# Patient Record
Sex: MALE | Race: WHITE | NOT HISPANIC OR LATINO | Employment: FULL TIME | URBAN - METROPOLITAN AREA
[De-identification: names, ages, dates, MRNs, and addresses within clinical notes are randomized per-mention and may not be internally consistent; named-entity substitution may affect disease eponyms.]

---
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
## 2020-12-22 ENCOUNTER — THERAPY VISIT (OUTPATIENT)
Dept: PHYSICAL THERAPY | Facility: CLINIC | Age: 20
End: 2020-12-22
Payer: COMMERCIAL

## 2020-12-22 DIAGNOSIS — M89.8X1 PERISCAPULAR PAIN: ICD-10-CM

## 2020-12-22 DIAGNOSIS — G25.89 SCAPULAR DYSKINESIS: ICD-10-CM

## 2020-12-22 PROCEDURE — 97112 NEUROMUSCULAR REEDUCATION: CPT | Mod: GP | Performed by: PHYSICAL THERAPIST

## 2020-12-22 PROCEDURE — 97161 PT EVAL LOW COMPLEX 20 MIN: CPT | Mod: GP | Performed by: PHYSICAL THERAPIST

## 2020-12-22 PROCEDURE — 97110 THERAPEUTIC EXERCISES: CPT | Mod: GP | Performed by: PHYSICAL THERAPIST

## 2020-12-22 NOTE — LETTER
[facility]  [address]  [address]  [facility] [address]  [phone]    2020    Re: Armond Hidalgo   :   2000  MRN:  [id number]   REFERRING PHYSICIAN:   Taniya PANTOJA ARI PT    Date of Initial Evaluation:  20  Visits:  Rxs Used: 1  Reason for Referral:     Periscapular pain  Scapular dyskinesis    EVALUATION SUMMARY    [facility] Initial Evaluation  Subjective:    Therapist Generated HPI Evaluation  Problem details: Asad is being seen today for left periscapular pain, scapular dyskinesis, and left shoulder capsular tightness by referral from Dr. Taniya Landers..         Type of problem:  Thoracic.    This is a new condition.  Condition occurred with:  Insidious onset.  Where condition occurred: for unknown reasons.  Site of Pain: medial to the inferior angle of the left scapula.  Pain is described as aching and sharp   Pain radiates to:  No radiation. Pain is the same all the time.  Since onset symptoms are unchanged.  Associated with: none. Exacerbated by: deep breaths, especially when he is in a forward head and rounded shoulders posture.  Relieved by: improved with good posture / alignment.  Imaging testing: none reported.  Previous treatment includes chiropractic. There was mild improvement following previous treatment.            Objective:  Standing Alignment:    Cervical/Thoracic:  Forward head, flat thoracic upper spine and thoracic kyphosis decreased (presents with increased muscle tone and hypertrophy on right right as conmpared to left)  Shoulder/UE:  Rounded shoulders, scapular winging L and scapular winging R    Gait:    Gait Type:  Normal   Assistive Devices:  None                    Re: Armond Hidalgo   :   2000      Cervical/Thoracic Evaluation  Cervical AROM: normal   Thoracic AROM: normal    Strength: periscapular strength:  Left MT = 4-/5, Left LT = 3/5  Headaches: none  Cervical Myotomes:   normal      Shoulder Evaluation:  ROM:  AROM:  normal    Strength:    Flexion: Left:5-/5   Pain:    Right: 5/5     Pain:     Abduction:  Left: 5-/5  Pain:    Right: 5/5     Pain:    Internal Rotation:  Left:5-/5     Pain:    Right: 5/5     Pain:  External Rotation:   Left:4+/5     Pain:   Right:5/5     Pain:    Horizontal Abduction:  Left:3+/5     Pain:    Right:4+/5    Pain:    Stability Testing:  not assessed    Special Tests:  not assessed    Assessment/Plan:    Patient is a 19 year old male with thoracic and left side shoulder complaints.    Patient has the following significant findings with corresponding treatment plan.                Diagnosis 1:  Left Periscapular Pain  Pain -  hot/cold therapy, self management, education and home program  Decreased strength - therapeutic exercise, therapeutic activities and home program  Impaired muscle performance - neuro re-education and home program  Decreased function - therapeutic activities and home program  Impaired posture - neuro re-education    Therapy Evaluation Codes:   1) History comprised of:   Personal factors that impact the plan of care:      None.    Comorbidity factors that impact the plan of care are:      None.     Medications impacting care: None.  2) Examination of Body Systems comprised of:   Body structures and functions that impact the plan of care:      Shoulder and Thoracic Spine.   Activity limitations that impact the plan of care are:      Driving, Reading/Computer work, Working and Breathing.  3) Clinical presentation characteristics are:   Stable/Uncomplicated.  4) Decision-Making    Low complexity using standardized patient assessment instrument and/or measureable assessment of functional outcome.  Cumulative Therapy Evaluation is: Low complexity.    Previous and current functional limitations:  (See Goal Flow Sheet for this information)    Short term and Long term goals: (See Goal Flow Sheet for this information)     Re: Armond Hidalgo    :   2000    Communication ability:  Patient appears to be able to clearly communicate and understand verbal and written communication and follow directions correctly.  Treatment Explanation - The following has been discussed with the patient:   RX ordered/plan of care  Anticipated outcomes  Possible risks and side effects  This patient would benefit from PT intervention to resume normal activities.   Rehab potential is excellent.    Frequency:  1 X week, once daily  Duration:  for 6 weeks  Discharge Plan:  Achieve all LTG.  Independent in home treatment program.  Reach maximal therapeutic benefit.    Thank you for your referral.    INQUIRIES  Therapist: Kaden Rodriguez, ZOFIA, SCS, CCI   [facility]  [address]  [address] [place] [id number]  Phone: [phone]  Fax: [phone]

## 2020-12-22 NOTE — PROGRESS NOTES
[facility] Initial Evaluation  Subjective:    Therapist Generated HPI Evaluation  Problem details: Asad is being seen today for left periscapular pain, scapular dyskinesis, and left shoulder capsular tightness by referral from Dr. Taniya Landers..         Type of problem:  Thoracic.    This is a new condition.  Condition occurred with:  Insidious onset.  Where condition occurred: for unknown reasons.  Site of Pain: medial to the inferior angle of the left scapula.  Pain is described as aching and sharp   Pain radiates to:  No radiation. Pain is the same all the time.  Since onset symptoms are unchanged.  Associated with: none. Exacerbated by: deep breaths, especially when he is in a forward head and rounded shoulders posture.  Relieved by: improved with good posture / alignment.  Imaging testing: none reported.  Previous treatment includes chiropractic. There was mild improvement following previous treatment.                          Objective:  Standing Alignment:    Cervical/Thoracic:  Forward head, flat thoracic upper spine and thoracic kyphosis decreased (presents with increased muscle tone and hypertrophy on right right as conmpared to left)  Shoulder/UE:  Rounded shoulders, scapular winging L and scapular winging R              Gait:    Gait Type:  Normal   Assistive Devices:  None                      Cervical/Thoracic Evaluation  Cervical AROM: normal   Thoracic AROM: normal    Strength: periscapular strength:  Left MT = 4-/5, Left LT = 3/5  Headaches: none  Cervical Myotomes:  normal                                       Shoulder Evaluation:  ROM:  AROM:  normal                                  Strength:    Flexion: Left:5-/5   Pain:    Right: 5/5     Pain:     Abduction:  Left: 5-/5  Pain:    Right: 5/5     Pain:    Internal Rotation:  Left:5-/5     Pain:    Right: 5/5     Pain:  External Rotation:   Left:4+/5     Pain:   Right:5/5     Pain:    Horizontal Abduction:  Left:3+/5      Pain:    Right:4+/5    Pain:        Stability Testing:  not assessed      Special Tests:  not assessed                                           General     ROS    Assessment/Plan:    Patient is a 19 year old male with thoracic and left side shoulder complaints.    Patient has the following significant findings with corresponding treatment plan.                Diagnosis 1:  Left Periscapular Pain  Pain -  hot/cold therapy, self management, education and home program  Decreased strength - therapeutic exercise, therapeutic activities and home program  Impaired muscle performance - neuro re-education and home program  Decreased function - therapeutic activities and home program  Impaired posture - neuro re-education    Therapy Evaluation Codes:   1) History comprised of:   Personal factors that impact the plan of care:      None.    Comorbidity factors that impact the plan of care are:      None.     Medications impacting care: None.  2) Examination of Body Systems comprised of:   Body structures and functions that impact the plan of care:      Shoulder and Thoracic Spine.   Activity limitations that impact the plan of care are:      Driving, Reading/Computer work, Working and Breathing.  3) Clinical presentation characteristics are:   Stable/Uncomplicated.  4) Decision-Making    Low complexity using standardized patient assessment instrument and/or measureable assessment of functional outcome.  Cumulative Therapy Evaluation is: Low complexity.    Previous and current functional limitations:  (See Goal Flow Sheet for this information)    Short term and Long term goals: (See Goal Flow Sheet for this information)     Communication ability:  Patient appears to be able to clearly communicate and understand verbal and written communication and follow directions correctly.  Treatment Explanation - The following has been discussed with the patient:   RX ordered/plan of care  Anticipated outcomes  Possible risks and side  effects  This patient would benefit from PT intervention to resume normal activities.   Rehab potential is excellent.    Frequency:  1 X week, once daily  Duration:  for 6 weeks  Discharge Plan:  Achieve all LTG.  Independent in home treatment program.  Reach maximal therapeutic benefit.    Please refer to the daily flowsheet for treatment today, total treatment time and time spent performing 1:1 timed codes.

## 2020-12-29 ENCOUNTER — THERAPY VISIT (OUTPATIENT)
Dept: PHYSICAL THERAPY | Facility: CLINIC | Age: 20
End: 2020-12-29
Payer: COMMERCIAL

## 2020-12-29 DIAGNOSIS — G25.89 SCAPULAR DYSKINESIS: ICD-10-CM

## 2020-12-29 DIAGNOSIS — M89.8X1 PERISCAPULAR PAIN: ICD-10-CM

## 2020-12-29 PROCEDURE — 97112 NEUROMUSCULAR REEDUCATION: CPT | Mod: GP

## 2020-12-29 PROCEDURE — 97110 THERAPEUTIC EXERCISES: CPT | Mod: GP

## 2021-01-06 ENCOUNTER — THERAPY VISIT (OUTPATIENT)
Dept: PHYSICAL THERAPY | Facility: CLINIC | Age: 21
End: 2021-01-06
Payer: COMMERCIAL

## 2021-01-06 DIAGNOSIS — M89.8X1 PERISCAPULAR PAIN: ICD-10-CM

## 2021-01-06 DIAGNOSIS — G25.89 SCAPULAR DYSKINESIS: ICD-10-CM

## 2021-01-06 PROCEDURE — 97110 THERAPEUTIC EXERCISES: CPT | Mod: GP

## 2021-01-06 PROCEDURE — 97112 NEUROMUSCULAR REEDUCATION: CPT | Mod: GP

## 2021-01-13 ENCOUNTER — THERAPY VISIT (OUTPATIENT)
Dept: PHYSICAL THERAPY | Facility: CLINIC | Age: 21
End: 2021-01-13
Payer: COMMERCIAL

## 2021-01-13 DIAGNOSIS — M89.8X1 PERISCAPULAR PAIN: ICD-10-CM

## 2021-01-13 DIAGNOSIS — G25.89 SCAPULAR DYSKINESIS: ICD-10-CM

## 2021-01-13 PROCEDURE — 97112 NEUROMUSCULAR REEDUCATION: CPT | Mod: GP

## 2021-01-13 PROCEDURE — 97110 THERAPEUTIC EXERCISES: CPT | Mod: GP

## 2021-03-04 NOTE — PROGRESS NOTES
DISCHARGE SUMMARY    Armond Hidalgo was seen 4 times for evaluation and treatment.  Patient did not return for further treatment and current status is unknown.  Due to short treatment duration, no objective or functional changes were made.  Please see goal flow sheet from episode noted date below and initial evaluation for further information.  Patient is discharged from therapy and therapy episode is resolved as of 03/04/21.      Linked Episodes   Type: Episode: Status: Noted: Resolved: Last update: Updated by:   PHYSICAL THERAPY Left periscapular pain Active 12/22/2020 1/13/2021  2:20 PM Kaden Rodriguez, PT      Comments:

## 2023-05-17 ENCOUNTER — OFFICE VISIT (OUTPATIENT)
Dept: FAMILY MEDICINE | Facility: CLINIC | Age: 23
End: 2023-05-17
Payer: COMMERCIAL

## 2023-05-17 VITALS
HEIGHT: 75 IN | BODY MASS INDEX: 22.34 KG/M2 | WEIGHT: 179.7 LBS | DIASTOLIC BLOOD PRESSURE: 60 MMHG | SYSTOLIC BLOOD PRESSURE: 130 MMHG | TEMPERATURE: 96.5 F | HEART RATE: 109 BPM | OXYGEN SATURATION: 98 % | RESPIRATION RATE: 18 BRPM

## 2023-05-17 DIAGNOSIS — J02.9 SORE THROAT: ICD-10-CM

## 2023-05-17 DIAGNOSIS — B00.2 HERPETIC GINGIVOSTOMATITIS: Primary | ICD-10-CM

## 2023-05-17 DIAGNOSIS — M54.50 ACUTE MIDLINE LOW BACK PAIN WITHOUT SCIATICA: ICD-10-CM

## 2023-05-17 DIAGNOSIS — M40.00 ACQUIRED POSTURAL KYPHOSIS: ICD-10-CM

## 2023-05-17 DIAGNOSIS — Q21.12 PFO (PATENT FORAMEN OVALE): ICD-10-CM

## 2023-05-17 DIAGNOSIS — Q23.81 BICUSPID AORTIC VALVE: ICD-10-CM

## 2023-05-17 PROBLEM — G25.89 SCAPULAR DYSKINESIS: Status: RESOLVED | Noted: 2020-12-22 | Resolved: 2023-05-17

## 2023-05-17 PROBLEM — M89.8X1 PERISCAPULAR PAIN: Status: RESOLVED | Noted: 2020-12-22 | Resolved: 2023-05-17

## 2023-05-17 LAB — DEPRECATED S PYO AG THROAT QL EIA: NEGATIVE

## 2023-05-17 PROCEDURE — 87651 STREP A DNA AMP PROBE: CPT | Performed by: PHYSICIAN ASSISTANT

## 2023-05-17 PROCEDURE — 99203 OFFICE O/P NEW LOW 30 MIN: CPT | Performed by: PHYSICIAN ASSISTANT

## 2023-05-17 RX ORDER — MOMETASONE FUROATE MONOHYDRATE 50 UG/1
SPRAY, METERED NASAL
COMMUNITY

## 2023-05-17 RX ORDER — OMEGA-3 FATTY ACIDS/FISH OIL 300-1000MG
200 CAPSULE ORAL 3 TIMES DAILY PRN
Start: 2023-05-17 | End: 2023-05-18

## 2023-05-17 RX ORDER — ACETAMINOPHEN 500 MG
1000 TABLET ORAL 3 TIMES DAILY PRN
Start: 2023-05-17

## 2023-05-17 RX ORDER — RIZATRIPTAN BENZOATE 10 MG/1
TABLET ORAL
COMMUNITY
Start: 2021-06-25

## 2023-05-17 RX ORDER — HYDROCORTISONE 25 MG/G
OINTMENT TOPICAL
COMMUNITY
Start: 2023-05-13 | End: 2023-11-06

## 2023-05-17 NOTE — PROGRESS NOTES
Assessment & Plan     Herpetic gingivostomatitis  Sore throat  Strep negative.  Symptoms consistent with viral process.  Offered magic mouthwash - but pt declined.  Will let me know if he changes his mind.  For now - supportive cares encouraged.  Crushed ice/water drink for comfort.  Advised of warning signs and when to seek urgent care.  Alternate tylenol/ibuprofen for pain management.    - acetaminophen (TYLENOL) 500 MG tablet  - ibuprofen (ADVIL/MOTRIN) 200 MG capsule  - Streptococcus A Rapid Screen w/Reflex to PCR - Clinic Collect  - Group A Streptococcus PCR Throat Swab    Bicuspid aortic valve  PFO (patent foramen ovale)  Historical.  Follows with cardiology.    Acute midline low back pain without sciatica  Acquired postural kyphosis  Suspect current 1 day flare of back pain due to viral illness.  Pt has postural kyphosis and works in a very sedentary role.  Would benefit from PHYSICAL THERAPY.  Referral placed.  Supportive analgesics recommended.    - acetaminophen (TYLENOL) 500 MG tablet  - ibuprofen (ADVIL/MOTRIN) 200 MG capsule  - Physical Therapy Referral      [facility]) [facility]  reviewed       Return in about 1 week (around 5/24/2023) for PT.    Mayra Gonzalez PA-C  [facility] MICHAEL Leahy is a 22 year old, presenting for the following health issues:  Pharyngitis        5/17/2023     3:00 PM   Additional Questions   Roomed by Elizabeth Centeno CMA   Accompanied by Mom     History of Present Illness       Back Pain:  He presents for follow up of back pain. Patient's back pain is a new problem.    Original cause of back pain: not sure  First noticed back pain: today  Patient feels back pain: constantlyLocation of back pain:  Right lower back, left lower back, right side of neck and left side of neck  Description of back pain: sharp  Back pain spreads: nowhere    Since patient first noticed  "back pain, pain is: unchanged  Does back pain interfere with his job:  Not applicable  On a scale of 1-10 (10 being the worst), patient describes pain as:  8  What makes back pain worse: nothing  Acupuncture: not tried  Acetaminophen: not tried  Activity or exercise: not tried  Chiropractor:  Not tried  Cold: helpful  Heat: not tried  Massage: not tried  Muscle relaxants: not tried  NSAIDS: not tried  Opioids: not tried  Physical Therapy: not tried  Rest: helpful  Steroid Injection: not tried  Stretching: not tried  Surgery: not tried  TENS unit: not tried  Topical pain relievers: not tried  Other healthcare providers patient is seeing for back pain: Chiropractor    Headaches:   Since the patient's last clinic visit, headaches are: improved  The patient is getting headaches:  Rarely  He is not able to do normal daily activities when he has a migraine.  The patient is taking the following rescue/relief medications:  Maxalt   Patient states \"I get some relief\" from the rescue/relief medications.   The patient is taking the following medications to prevent migraines:  No medications to prevent migraines  In the past 4 weeks, the patient has gone to an Urgent Care or Emergency Room 0 times times due to headaches.    Reason for visit:  Feeling ill, headaches,back pain,neck pain,nausea,sore throat  Symptom onset:  3-7 days ago  Symptoms include:  Body aches, sore throat, nausea  Symptom intensity:  Moderate  Symptom progression:  Staying the same  Had these symptoms before:  Yes  Has tried/received treatment for these symptoms:  Yes  Previous treatment was successful:  Yes  Prior treatment description:  Medication  What makes it worse:  No  What makes it better:  Sleeping    He eats 2-3 servings of fruits and vegetables daily.He consumes 2 sweetened beverage(s) daily.He exercises with enough effort to increase his heart rate 60 or more minutes per day.  He exercises with enough effort to increase his heart rate 3 or less " "days per week.   He is taking medications regularly.             Review of Systems   Constitutional, HEENT, cardiovascular, pulmonary, gi and gu systems are negative, except as otherwise noted.      Objective    /60   Pulse 109   Temp (!) 96.5  F (35.8  C) (Tympanic)   Resp 18   Ht 1.905 m (6' 3\")   Wt 81.5 kg (179 lb 11.2 oz)   SpO2 98%   BMI 22.46 kg/m    Body mass index is 22.46 kg/m .  Physical Exam   GENERAL: healthy, alert and no distress  EYES: Eyes grossly normal to inspection, PERRL and conjunctivae and sclerae normal  HENT: ear canals and TM's normal, nose and ulcerative lesion noted at the very posterior soft palate near oropharynx opening.  Erythematous posterior pharynx with scattered small ulcerative lesions on pharynx and near tonsils  NECK: no adenopathy, no asymmetry, masses, or scars and thyroid normal to palpation  RESP: lungs clear to auscultation - no rales, rhonchi or wheezes  CV: regular rate and rhythm, normal S1 S2, no S3 or S4, no murmur, click or rub, no peripheral edema and peripheral pulses strong  MS: no gross musculoskeletal defects noted, no edema, exaggerated postural kyphosis and loss of lumbar lordosis noted with perilumbar and periscapular tenderness  SKIN: no suspicious lesions or rashes  NEURO: Normal strength and tone, mentation intact and speech normal  PSYCH: mentation appears normal, affect normal/bright    Results for orders placed or performed in visit on 05/17/23   Streptococcus A Rapid Screen w/Reflex to PCR - Clinic Collect     Status: Normal    Specimen: Throat; Swab   Result Value Ref Range    Group A Strep antigen Negative Negative                   "

## 2023-05-18 LAB — GROUP A STREP BY PCR: NOT DETECTED

## 2023-05-18 RX ORDER — OMEGA-3 FATTY ACIDS/FISH OIL 300-1000MG
800 CAPSULE ORAL 3 TIMES DAILY PRN
Start: 2023-05-18

## 2023-05-19 NOTE — RESULT ENCOUNTER NOTE
Armond  I have reviewed your recent test results:    -All of your labs are normal.    For additional lab test information, www.[email or web address] is an excellent reference.     If you have any questions please do not hesitate to contact our office via phone ([phone]) or MyChart.    Healthy regards,     Mayra Gonzalez MBA, MS, PA-C  [facility]

## 2023-06-02 ENCOUNTER — HEALTH MAINTENANCE LETTER (OUTPATIENT)
Age: 23
End: 2023-06-02

## 2023-11-06 ENCOUNTER — OFFICE VISIT (OUTPATIENT)
Dept: FAMILY MEDICINE | Facility: CLINIC | Age: 23
End: 2023-11-06

## 2023-11-06 VITALS
OXYGEN SATURATION: 99 % | TEMPERATURE: 98 F | HEART RATE: 62 BPM | DIASTOLIC BLOOD PRESSURE: 78 MMHG | SYSTOLIC BLOOD PRESSURE: 122 MMHG | BODY MASS INDEX: 23.61 KG/M2 | HEIGHT: 74 IN | RESPIRATION RATE: 20 BRPM | WEIGHT: 184 LBS

## 2023-11-06 DIAGNOSIS — Z13.1 DIABETES MELLITUS SCREENING: ICD-10-CM

## 2023-11-06 DIAGNOSIS — B35.6 TINEA CRURIS: ICD-10-CM

## 2023-11-06 DIAGNOSIS — Z13.220 LIPID SCREENING: ICD-10-CM

## 2023-11-06 DIAGNOSIS — Z76.89 HEALTH CARE HOME: ICD-10-CM

## 2023-11-06 DIAGNOSIS — Q21.10 ASD (ATRIAL SEPTAL DEFECT): ICD-10-CM

## 2023-11-06 DIAGNOSIS — D68.51 HETEROZYGOUS FACTOR V LEIDEN MUTATION (H): ICD-10-CM

## 2023-11-06 DIAGNOSIS — Z71.89 ACP (ADVANCE CARE PLANNING): Primary | ICD-10-CM

## 2023-11-06 DIAGNOSIS — Z00.00 ROUTINE PHYSICAL EXAMINATION: ICD-10-CM

## 2023-11-06 DIAGNOSIS — Q23.81 BICUSPID AORTIC VALVE: ICD-10-CM

## 2023-11-06 PROCEDURE — 80053 COMPREHEN METABOLIC PANEL: CPT | Performed by: STUDENT IN AN ORGANIZED HEALTH CARE EDUCATION/TRAINING PROGRAM

## 2023-11-06 PROCEDURE — 99385 PREV VISIT NEW AGE 18-39: CPT | Performed by: STUDENT IN AN ORGANIZED HEALTH CARE EDUCATION/TRAINING PROGRAM

## 2023-11-06 PROCEDURE — 36415 COLL VENOUS BLD VENIPUNCTURE: CPT | Performed by: STUDENT IN AN ORGANIZED HEALTH CARE EDUCATION/TRAINING PROGRAM

## 2023-11-06 PROCEDURE — 80061 LIPID PANEL: CPT | Performed by: STUDENT IN AN ORGANIZED HEALTH CARE EDUCATION/TRAINING PROGRAM

## 2023-11-06 RX ORDER — FLUCONAZOLE 200 MG/1
200 TABLET ORAL WEEKLY
Qty: 4 TABLET | Refills: 0 | Status: SHIPPED | OUTPATIENT
Start: 2023-11-06 | End: 2023-11-28

## 2023-11-06 NOTE — PATIENT INSTRUCTIONS
Continue regular Cardiology and Dermatology follow-up     Hematology consult also good idea    Fluconazole once weekly for 4 weeks    Follow-up yearly, sooner if any concerns

## 2023-11-06 NOTE — PROGRESS NOTES
"  SUBJECTIVE:   CC: Armond Hidalgo is an 22 year old male who presents for preventive health visit.     Patient has been advised of split billing requirements and indicates understanding: Yes    Nursing Notes:   Pilar Payne CMA  11/6/2023  4:07 PM  Signed  Chief Complaint   Patient presents with    New Patient     New patient to this clinic     Physical     Annual, fasting      Pre-visit Screening:  Immunizations:  not up to date - due for 3rd HPV shot but patient declined this today   Colonoscopy:  na  Mammogram: na  Asthma Action Test/Plan:  na  PHQ9:  PHQ2 done today   GAD7:  na  Questioned patient about current smoking habits Pt. has never smoked.  Ok to leave detailed message on voice mail for today's visit only yes, phone # [phone] (home)        Healthy Habits:  General health: no concerns  Diet: good, no restrictions   Exercise: running/workouts, goes in phases  Sleep: no concerns   Mental Health: no concerns      Problems taking medications regularly N/a  Have you had an eye exam in the past two years? Yes, glasses  Do you see a dentist twice per year? yes  Do you have sleep apnea, excessive snoring or daytime drowsiness?no      Today's PHQ-2 Score:       11/6/2023     4:07 PM 5/17/2023     3:01 PM   PHQ-2 ( 1999 [employer])   Q1: Little interest or pleasure in doing things 0 0   Q2: Feeling down, depressed or hopeless 0 0   PHQ-2 Score 0 0   Q1: Little interest or pleasure in doing things  Not at all   Q2: Feeling down, depressed or hopeless  Not at all   PHQ-2 Score  0       Do you feel safe in your environment? Yes        Social History     Tobacco Use    Smoking status: Never    Smokeless tobacco: Never   Substance Use Topics    Alcohol use: Yes     Comment: occasional     If you drink alcohol do you typically have >3 drinks per day or >7 drinks per week? No                      Last PSA: No results found for: \"PSA\"    Reviewed orders with patient. Reviewed health maintenance and updated orders " "accordingly - Yes  Lab work is in process  Labs reviewed in EPIC  BP Readings from Last 3 Encounters:   11/06/23 122/78   05/17/23 130/60    Wt Readings from Last 3 Encounters:   11/06/23 83.5 kg (184 lb)   05/17/23 81.5 kg (179 lb 11.2 oz)                    Reviewed and updated as needed this visit by clinical staff   Tobacco  Allergies  Meds  Problems             Reviewed and updated as needed this visit by Provider                 Past Medical History:   Diagnosis Date    Concussion     Premature infant     31 wks    Syncope     [employer] in [employer]      Past Surgical History:   Procedure Laterality Date    ECHO BRANDEN      SKIN / NAIL BIOPSY       Family History   Problem Relation Age of Onset    Transient ischemic attack Mother 59    Hypertension Father     Factor V Leiden deficiency Father     Anxiety Disorder Sister     No Known Problems Sister     Leukemia Maternal Grandfather     Coronary Artery Disease Paternal Grandmother     Coronary Artery Disease Paternal Grandfather     Factor V Leiden deficiency Other         P uncle with hx PE and CVA    Colon Cancer No family hx of     Prostate Cancer No family hx of        ROS:  12 point ROS performed and negative for new concerns except as mentioned above     OBJECTIVE:   /78 (BP Location: Right arm, Patient Position: Sitting, Cuff Size: Adult Large)   Pulse 62   Temp 98  F (36.7  C) (Temporal)   Resp 20   Ht 1.88 m (6' 2\")   Wt 83.5 kg (184 lb)   SpO2 99%   BMI 23.62 kg/m    EXAM:  GENERAL: healthy, alert and no distress  EYES: Eyes grossly normal to inspection, PERRL and conjunctivae and sclerae normal  HENT: ear canals and TM's normal, nose and mouth without ulcers or lesions  NECK: no adenopathy, no asymmetry, masses, or scars and thyroid normal to palpation  RESP: lungs clear to auscultation - no rales, rhonchi or wheezes  CV: regular rate and rhythm, normal S1 S2, no S3 or S4, no murmur, click or rub, no peripheral edema and peripheral " "pulses strong  ABDOMEN: soft, nontender, no hepatosplenomegaly, no masses and bowel sounds normal   (male): +tinea cruris, normal male genitalia without lesions or urethral discharge, no hernia  MS: no gross musculoskeletal defects noted, no edema  SKIN: no suspicious lesions or rashes  NEURO: Normal strength and tone, mentation intact and speech normal  PSYCH: mentation appears normal, affect normal/bright    Diagnostic Test Results:  Labs reviewed in Epic    ASSESSMENT/PLAN:       ICD-10-CM    1. ACP (advance care planning)  Z71.89       2. Health Care Home  Z76.89       3. ASD (atrial septal defect)  Q21.10 Adult Oncology/Hematology  Referral - To a [facility] Location (Use POS/Location)      4. Bicuspid aortic valve  Q23.1       5. Heterozygous factor V Leiden mutation (H24)  D68.51 Adult Oncology/Hematology  Referral - To a [facility] Location (Use POS/Location)      6. Tinea cruris  B35.6 fluconazole (DIFLUCAN) 200 MG tablet     Comprehensive Metobolic Panel (BFP)      7. Routine physical examination  Z00.00       8. Diabetes mellitus screening  Z13.1 Comprehensive Metobolic Panel (BFP)      9. Lipid screening  Z13.220 Lipid Panel (BFP)           Patient has been advised of split billing requirements and indicates understanding: Yes  COUNSELING:  Reviewed preventive health counseling, as reflected in patient instructions       Regular exercise       Healthy diet/nutrition       Vision screening       Immunizations       Alcohol Use        Safe sex practices/STD prevention       Colorectal cancer screening       Prostate cancer screening    Estimated body mass index is 23.62 kg/m  as calculated from the following:    Height as of this encounter: 1.88 m (6' 2\").    Weight as of this encounter: 83.5 kg (184 lb).      He reports that he has never smoked. He has never used smokeless tobacco.    Patient Instructions   Continue regular Cardiology and Dermatology follow-up "     Hematology consult also good idea    Fluconazole once weekly for 4 weeks    Follow-up yearly, sooner if any concerns      Giacomo Alcala MD, CAM  [facility]

## 2023-11-06 NOTE — NURSING NOTE
Chief Complaint   Patient presents with    New Patient     New patient to this clinic     Physical     Annual, fasting      Pre-visit Screening:  Immunizations:  not up to date - due for 3rd HPV shot but patient declined this today   Colonoscopy:  na  Mammogram: na  Asthma Action Test/Plan:  na  PHQ9:  PHQ2 done today   GAD7:  na  Questioned patient about current smoking habits Pt. has never smoked.  Ok to leave detailed message on voice mail for today's visit only yes, phone # [phone] (home)

## 2023-11-07 LAB
ALBUMIN SERPL-MCNC: 5.3 G/DL (ref 3.6–5.1)
ALBUMIN/GLOB SERPL: 2 {RATIO} (ref 1–2.5)
ALP SERPL-CCNC: 73 U/L (ref 33–130)
ALT 1742-6: 12 U/L (ref 0–32)
AST 1920-8: 19 U/L (ref 0–35)
BILIRUB SERPL-MCNC: 1.5 MG/DL (ref 0.2–1.2)
BUN SERPL-MCNC: 12 MG/DL (ref 7–25)
BUN/CREATININE RATIO: 10.6 (ref 6–32)
CALCIUM SERPL-MCNC: 10.1 MG/DL (ref 8.6–10.3)
CHLORIDE SERPLBLD-SCNC: 102 MMOL/L (ref 98–110)
CHOLEST SERPL-MCNC: 212 MG/DL (ref 0–199)
CHOLEST/HDLC SERPL: 4 {RATIO} (ref 0–5)
CO2 SERPL-SCNC: 25.8 MMOL/L (ref 20–32)
CREAT SERPL-MCNC: 1.13 MG/DL (ref 0.6–1.3)
GLOBULIN, CALCULATED - QUEST: 2.7 (ref 1.9–3.7)
GLUCOSE SERPL-MCNC: 93 MG/DL (ref 60–99)
HDLC SERPL-MCNC: 55 MG/DL (ref 40–150)
LDLC SERPL CALC-MCNC: 140 MG/DL (ref 0–130)
POTASSIUM SERPL-SCNC: 4.02 MMOL/L (ref 3.5–5.3)
PROT SERPL-MCNC: 8 G/DL (ref 6.1–8.1)
SODIUM SERPL-SCNC: 141 MMOL/L (ref 135–146)
TRIGL SERPL-MCNC: 87 MG/DL (ref 0–149)

## 2024-02-07 ENCOUNTER — OFFICE VISIT (OUTPATIENT)
Dept: FAMILY MEDICINE | Facility: CLINIC | Age: 24
End: 2024-02-07
Payer: COMMERCIAL

## 2024-02-07 ENCOUNTER — TELEPHONE (OUTPATIENT)
Dept: FAMILY MEDICINE | Facility: CLINIC | Age: 24
End: 2024-02-07

## 2024-02-07 VITALS
TEMPERATURE: 98 F | WEIGHT: 181 LBS | HEIGHT: 74 IN | OXYGEN SATURATION: 96 % | DIASTOLIC BLOOD PRESSURE: 60 MMHG | BODY MASS INDEX: 23.23 KG/M2 | HEART RATE: 102 BPM | SYSTOLIC BLOOD PRESSURE: 120 MMHG | RESPIRATION RATE: 16 BRPM

## 2024-02-07 DIAGNOSIS — J02.9 SORE THROAT: ICD-10-CM

## 2024-02-07 DIAGNOSIS — J06.9 UPPER RESPIRATORY TRACT INFECTION, UNSPECIFIED TYPE: Primary | ICD-10-CM

## 2024-02-07 DIAGNOSIS — J10.1 INFLUENZA A: ICD-10-CM

## 2024-02-07 DIAGNOSIS — D68.51 HETEROZYGOUS FACTOR V LEIDEN MUTATION (H): ICD-10-CM

## 2024-02-07 LAB
DEPRECATED S PYO AG THROAT QL EIA: NEGATIVE
FLUAV AG SPEC QL IA: POSITIVE
FLUBV AG SPEC QL IA: NEGATIVE
GROUP A STREP BY PCR: NOT DETECTED

## 2024-02-07 PROCEDURE — 87804 INFLUENZA ASSAY W/OPTIC: CPT | Performed by: FAMILY MEDICINE

## 2024-02-07 PROCEDURE — 87651 STREP A DNA AMP PROBE: CPT | Performed by: FAMILY MEDICINE

## 2024-02-07 PROCEDURE — 99214 OFFICE O/P EST MOD 30 MIN: CPT | Performed by: FAMILY MEDICINE

## 2024-02-07 PROCEDURE — 87635 SARS-COV-2 COVID-19 AMP PRB: CPT | Performed by: FAMILY MEDICINE

## 2024-02-07 RX ORDER — OSELTAMIVIR PHOSPHATE 75 MG/1
75 CAPSULE ORAL 2 TIMES DAILY
Qty: 10 CAPSULE | Refills: 0 | Status: SHIPPED | OUTPATIENT
Start: 2024-02-07 | End: 2024-02-12

## 2024-02-07 NOTE — TELEPHONE ENCOUNTER
Patient's mother calling.  Patient has had a fever x 3 days (highest was 102).  Also has a back ache/body aches and sore throat.  Patient has a history of + strep per mom.    She would like an appointment to r/o strep.  No know exposure to covid.  Patient advised to wear a mask in the clinic.    Appointment scheduled.

## 2024-02-07 NOTE — PROGRESS NOTES
{PROVIDER CHARTING PREFERENCE:402733}    Shelia Kamara is a 23 year old, presenting for the following health issues:  URI      2/7/2024    10:12 AM   Additional Questions   Roomed by Jessica JIMENEZ CMA     HPI     {MA/LPN/RN Pre-Provider Visit Orders- hCG/UA/Strep (Optional):350898}  {SUPERLIST (Optional):640271}  {additonal problems for provider to add (Optional):389947}    {ROS Picklists (Optional):601547}      Objective    There were no vitals taken for this visit.  There is no height or weight on file to calculate BMI.  Physical Exam   {Exam List (Optional):446664}    {Diagnostic Test Results (Optional):977515}        Signed Electronically by: Dusty Shafer Jr, MD  {Email feedback regarding this note to [email or web address]   :409226}

## 2024-02-07 NOTE — PROGRESS NOTES
Assessment & Plan     Upper respiratory tract infection, unspecified type  Due to influenza A  - Symptomatic COVID-19 Virus (Coronavirus) by PCR Nasopharyngeal  - Influenza A & B Antigen - Clinic Collect    Sore throat  Negative result  - Streptococcus A Rapid Screen w/Reflex to PCR - Clinic Collect  - Group A Streptococcus PCR Throat Swab    Heterozygous factor V Leiden mutation (H24)  No history of clotting.  No on any anticoagulants.    Influenza A  Positive.  Given his congential heart disease (ASD, PFO) recommend 5 day course of Tamiflu.  Discussed with Asad and reassured about safety profile and greater risk of influenza complication with his congenital heart disease vs Tamiflu.  - oseltamivir (TAMIFLU) 75 MG capsule; Take 1 capsule (75 mg) by mouth 2 times daily for 5 days                  Subjective   Asad is a 23 year old, presenting for the following health issues:  URI        2/7/2024    10:12 AM   Additional Questions   Roomed by Jessica JIMENEZ CMA     History of Present Illness       Back Pain:  He presents for follow up of back pain. Patient's back pain is a new problem.    Original cause of back pain: other  First noticed back pain: in the last week  Patient feels back pain: comes and goesLocation of back pain:  Right lower back and left lower back  Description of back pain: dull ache  Back pain spreads: nowhere    Since patient first noticed back pain, pain is: unchanged  Does back pain interfere with his job:  No  On a scale of 1-10 (10 being the worst), patient describes pain as:  7  What makes back pain worse: nothing   Acupuncture: not tried  Acetaminophen: helpful  Activity or exercise: not tried  Chiropractor:  Not tried  Cold: helpful  Heat: not tried  Massage: not tried  Muscle relaxants: not tried  NSAIDS: not tried  Opioids: not tried  Physical Therapy: not tried  Rest: not helpful  Steroid Injection: not tried  Stretching: not tried  Surgery: not tried  TENS unit: not tried  Topical pain  "relievers: not tried  Other healthcare providers patient is seeing for back pain: None    Headaches:   Since the patient's last clinic visit, headaches are: no change  The patient is getting headaches:  Since rani been sick  He is not able to do normal daily activities when he has a migraine.  The patient is taking the following rescue/relief medications:  Ibuprofen (Advil, Motrin) and Maxalt   Patient states \"I get some relief\" from the rescue/relief medications.   The patient is taking the following medications to prevent migraines:  No medications to prevent migraines  In the past 4 weeks, the patient has gone to an Urgent Care or Emergency Room 0 times times due to headaches.    Reason for visit:  Feeling sick  Symptom onset:  1-3 days ago  Symptoms include:  Fever cough congestion headache back ache  Symptom intensity:  Severe  Symptom progression:  Staying the same  Had these symptoms before:  Yes  Has tried/received treatment for these symptoms:  No  What makes it worse:  No  What makes it better:  Advil    He eats 2-3 servings of fruits and vegetables daily.He consumes 1 sweetened beverage(s) daily.He exercises with enough effort to increase his heart rate 20 to 29 minutes per day.  He exercises with enough effort to increase his heart rate 3 or less days per week.   He is taking medications regularly.         Acute Illness  Acute illness concerns: URI  Onset/Duration: 2 days ago   Symptoms:  Fever: YES- 102 this morning  Chills/Sweats: YES  Headache (location?): YES  Sinus Pressure: YES  Conjunctivitis:  No  Ear Pain: no  Rhinorrhea: YES  Congestion: YES  Sore Throat: YES  Cough: YES-productive of yellow sputum  Wheeze: No  Decreased Appetite: YES  Nausea: No  Vomiting: No  Diarrhea: YES  Dysuria/Freq.: No  Dysuria or Hematuria: No  Fatigue/Achiness: YES  Sick/Strep Exposure: No  Therapies tried and outcome: Advil             Objective    /60   Pulse 102   Temp 98  F (36.7  C) (Tympanic)   Resp 16  " " Ht 1.88 m (6' 2\")   Wt 82.1 kg (181 lb)   SpO2 96%   BMI 23.24 kg/m    Body mass index is 23.24 kg/m .  Physical Exam   GENERAL: alert and no distress  EYES: Eyes grossly normal to inspection, PERRL and conjunctivae and sclerae normal  HENT: ear canals and TM's normal, nose and mouth without ulcers or lesions  NECK: no adenopathy, no asymmetry, masses, or scars  RESP: lungs clear to auscultation - no rales, rhonchi or wheezes  CV: regular rate and rhythm, normal S1 S2, no S3 or S4, no murmur, click or rub, no peripheral edema  ABDOMEN: soft, nontender, no hepatosplenomegaly, no masses and bowel sounds normal  MS: no gross musculoskeletal defects noted, no edema    Results for orders placed or performed in visit on 02/07/24 (from the past 24 hour(s))   Streptococcus A Rapid Screen w/Reflex to PCR - Clinic Collect    Specimen: Throat; Swab   Result Value Ref Range    Group A Strep antigen Negative Negative   Influenza A & B Antigen - Clinic Collect    Specimen: Nose; Swab   Result Value Ref Range    Influenza A antigen Positive (A) Negative    Influenza B antigen Negative Negative    Narrative    Test results must be correlated with clinical data. If necessary, results should be confirmed by a molecular assay or viral culture.           Signed Electronically by: Dusyt Shafer Jr, MD    "

## 2024-02-08 LAB — SARS-COV-2 RNA RESP QL NAA+PROBE: NEGATIVE

## 2024-02-09 NOTE — RESULT ENCOUNTER NOTE
Mr. Hidalgo,    Your COVID test is negative.    If you have further questions about the interpretation of your labs, [email or web address] is a good website to check out for further information.    Please contact the clinic if you have additional questions.  Thank you.    Sincerely,    Dusty Shafer MD

## 2024-06-17 PROBLEM — Z76.89 HEALTH CARE HOME: Status: RESOLVED | Noted: 2023-11-06 | Resolved: 2024-06-17

## 2025-01-04 ENCOUNTER — HEALTH MAINTENANCE LETTER (OUTPATIENT)
Age: 25
End: 2025-01-04